# Patient Record
Sex: MALE | Race: WHITE
[De-identification: names, ages, dates, MRNs, and addresses within clinical notes are randomized per-mention and may not be internally consistent; named-entity substitution may affect disease eponyms.]

---

## 2018-06-19 ENCOUNTER — HOSPITAL ENCOUNTER (EMERGENCY)
Dept: HOSPITAL 62 - ER | Age: 33
Discharge: HOME | End: 2018-06-19
Payer: COMMERCIAL

## 2018-06-19 VITALS — DIASTOLIC BLOOD PRESSURE: 70 MMHG | SYSTOLIC BLOOD PRESSURE: 124 MMHG
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
## 2018-06-19 DIAGNOSIS — F17.210: ICD-10-CM

## 2018-06-19 DIAGNOSIS — S93.402A: Primary | ICD-10-CM
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
## 2018-06-19 PROCEDURE — L1902 AFO ANKLE GAUNTLET PRE OTS: HCPCS

## 2018-06-19 PROCEDURE — 99283 EMERGENCY DEPT VISIT LOW MDM: CPT

## 2018-06-19 PROCEDURE — 73610 X-RAY EXAM OF ANKLE: CPT

## 2018-06-19 PROCEDURE — 2W3RX1Z IMMOBILIZATION OF LEFT LOWER LEG USING SPLINT: ICD-10-PCS | Performed by: NURSE PRACTITIONER

## 2018-06-19 NOTE — ER DOCUMENT REPORT
HPI





- HPI


Patient complains to provider of: left ankle injury


Onset: Last week


Onset/Duration: Sudden


Quality of pain: Throbbing


Severity: Moderate


Pain Level: 4


Context: 





Patient rolled his ankle last week while working as a .  States the 

pain has become much worse over the last few days.  States his left leg gave 

out today from the pain.


Associated Symptoms: None


Exacerbated by: Movement, Walking


Relieved by: Denies


Similar symptoms previously: Yes


Recently seen / treated by doctor: No





- ROS


ROS below otherwise negative: Yes


Systems Reviewed and Negative: Yes All other systems reviewed and negative





- CONSTITUTIONAL


Constitutional: DENIES: Fever





- EENT


EENT: DENIES: Congestion





- NEURO


Neurology: DENIES: Headache





- CARDIOVASCULAR


Cardiovascular: DENIES: Chest pain





- RESPIRATORY


Respiratory: DENIES: Trouble Breathing





- MUSCULOSKELETAL


Musculoskeletal: REPORTS: Extremity pain - Left ankle





- DERM


Skin Color: Normal





Past Medical History





- General


Information source: Patient





- Social History


Smoking Status: Current Every Day Smoker


Cigarette use (# per day): Yes


Frequency of alcohol use: None


Drug Abuse: None


Lives with: Family


Family History: Reviewed & Not Pertinent





- Medical History


Medical History: Negative


Past Surgical History: Reports: Hx Abdominal Surgery, Hx Herniorrhaphy





Vertical Provider Document





- CONSTITUTIONAL


Agree With Documented VS: Yes


Exam Limitations: No Limitations


General Appearance: WD/WN, No Apparent Distress





- INFECTION CONTROL


TRAVEL OUTSIDE OF THE [place] IN LAST 30 DAYS: No





- HEENT


HEENT: Atraumatic, Normocephalic





- RESPIRATORY


Respiratory: Breath Sounds Normal, No Respiratory Distress





- CARDIOVASCULAR


Cardiovascular: Regular Rate, Regular Rhythm





- MUSCULOSKELETAL/EXTREMETIES


Musculoskeletal/Extremeties: Tender - Left medial ankle.  Pain with flexion and 

external rotation of the left ankle, No Edema, Eccymosis


Notes: 





Patient able to move toes freely, neurovascular and sensation intact to left 

foot





- NEURO


Level of Consciousness: Awake, Alert, Appropriate





- DERM


Integumentary: Warm, Dry





Course





- Re-evaluation


Re-evalutation: 





06/19/18 15:04


xrays negative and this was discussed with pt.





- Vital Signs


Vital signs: 


 











Temp Pulse Resp BP Pulse Ox


 


 98.5 F   92   16   124/70   95 


 


 06/19/18 14:11  06/19/18 14:11  06/19/18 14:11  06/19/18 14:11  06/19/18 14:11














Procedures





- Immobilization


  ** Left Ankle


Pre-Proc Neuro Vasc Exam: Normal


Immobilizer type: Ankle stirrup


Performed by: PCT


Post-Proc Neuro Vasc Exam: Normal


Alignment checked and good: Yes





Discharge





- Discharge


Clinical Impression: 


Left ankle sprain


Qualifiers:


 Encounter type: initial encounter Involved ligament of ankle: unspecified 

ligament Qualified Code(s): S93.402A - Sprain of unspecified ligament of left 

ankle, initial encounter





Condition: Good


Disposition: HOME, SELF-CARE


Instructions:  Ankle Stirrup Splint (OM), Use of Crutches (OM), Ice & 

Elevation (OM), Ice Packs (OM)


Additional Instructions: 


Motrin as prescribed as needed for pain


Ice and elevate ankle


Follow-up with your primary care physician if not better in 1 week


Return as needed


Prescriptions: 


Ibuprofen 800 mg PO TID PRN #15 tablet


 PRN Reason:

## 2018-06-19 NOTE — RADIOLOGY REPORT (SQ)
EXAM DESCRIPTION:  ANKLE LEFT COMPLETE



COMPLETED DATE/TIME:  6/19/2018 2:58 pm



REASON FOR STUDY:  injury



COMPARISON:  None.



NUMBER OF VIEWS:  Three views.



TECHNIQUE:  AP, lateral, and oblique radiographic images acquired of the left ankle.



LIMITATIONS:  None.



FINDINGS:  MINERALIZATION: Normal.

BONES: No acute fracture or dislocation.  No worrisome bone lesions.

JOINTS: No effusions.

SOFT TISSUES: Moderate soft tissue swelling.  No fracture.

OTHER: No other significant finding.



IMPRESSION:  Soft tissue swelling.  No fracture.



TECHNICAL DOCUMENTATION:  JOB ID:  [id number]

 2011 [employer]- All Rights Reserved



Reading location - IP/workstation name: [email or web address]

## 2019-07-05 ENCOUNTER — HOSPITAL ENCOUNTER (EMERGENCY)
Dept: HOSPITAL 62 - ER | Age: 34
LOS: 1 days | Discharge: LEFT BEFORE DISCHARGE | End: 2019-07-06
Payer: SELF-PAY

## 2019-07-05 VITALS — SYSTOLIC BLOOD PRESSURE: 135 MMHG | DIASTOLIC BLOOD PRESSURE: 85 MMHG

## 2019-07-05 DIAGNOSIS — Z53.21: Primary | ICD-10-CM

## 2019-07-05 PROCEDURE — 93005 ELECTROCARDIOGRAM TRACING: CPT

## 2019-07-05 PROCEDURE — 93010 ELECTROCARDIOGRAM REPORT: CPT
